# Patient Record
Sex: MALE | Race: WHITE | Employment: FULL TIME | URBAN - METROPOLITAN AREA
[De-identification: names, ages, dates, MRNs, and addresses within clinical notes are randomized per-mention and may not be internally consistent; named-entity substitution may affect disease eponyms.]

---

## 2021-03-10 DIAGNOSIS — Z23 ENCOUNTER FOR IMMUNIZATION: ICD-10-CM

## 2024-09-18 ENCOUNTER — TELEPHONE (OUTPATIENT)
Age: 69
End: 2024-09-18

## 2024-09-18 NOTE — TELEPHONE ENCOUNTER
New Patient Triage  Please Triage:   New Patient-   What is the reason for the patients appointment?  R35.1 (ICD-10-CM) - Nocturia       Imaging/Lab Results:     Insurance:   Do we accept the pt's insurance or is the patient Self-Pay?  Provider & Plan:  medicare   Member ID#:   [id number]         History  Has the pt had any previous urologist? no    Have pt records been requested? No    Has the pt had any outside testing done? no    Does the pt have a personal history of cancer?: no

## 2024-09-19 ENCOUNTER — CONSULT (OUTPATIENT)
Dept: UROLOGY | Facility: CLINIC | Age: 69
End: 2024-09-19
Payer: MEDICARE

## 2024-09-19 VITALS
SYSTOLIC BLOOD PRESSURE: 124 MMHG | OXYGEN SATURATION: 98 % | HEIGHT: 76 IN | HEART RATE: 64 BPM | DIASTOLIC BLOOD PRESSURE: 80 MMHG | BODY MASS INDEX: 30.86 KG/M2 | WEIGHT: 253.4 LBS

## 2024-09-19 DIAGNOSIS — Z12.5 SCREENING FOR PROSTATE CANCER: ICD-10-CM

## 2024-09-19 DIAGNOSIS — R35.1 BENIGN PROSTATIC HYPERPLASIA WITH NOCTURIA: ICD-10-CM

## 2024-09-19 DIAGNOSIS — N40.1 BENIGN PROSTATIC HYPERPLASIA WITH NOCTURIA: ICD-10-CM

## 2024-09-19 DIAGNOSIS — R35.1 NOCTURIA: Primary | ICD-10-CM

## 2024-09-19 LAB
POST-VOID RESIDUAL VOLUME, ML POC: 6 ML
SL AMB  POCT GLUCOSE, UA: ABNORMAL
SL AMB LEUKOCYTE ESTERASE,UA: ABNORMAL
SL AMB POCT BILIRUBIN,UA: ABNORMAL
SL AMB POCT BLOOD,UA: ABNORMAL
SL AMB POCT CLARITY,UA: CLEAR
SL AMB POCT COLOR,UA: YELLOW
SL AMB POCT KETONES,UA: ABNORMAL
SL AMB POCT NITRITE,UA: ABNORMAL
SL AMB POCT PH,UA: 5.6
SL AMB POCT SPECIFIC GRAVITY,UA: 1.03
SL AMB POCT URINE PROTEIN: ABNORMAL
SL AMB POCT UROBILINOGEN: 0.2

## 2024-09-19 PROCEDURE — 99204 OFFICE O/P NEW MOD 45 MIN: CPT | Performed by: PHYSICIAN ASSISTANT

## 2024-09-19 PROCEDURE — 51798 US URINE CAPACITY MEASURE: CPT | Performed by: PHYSICIAN ASSISTANT

## 2024-09-19 PROCEDURE — 81003 URINALYSIS AUTO W/O SCOPE: CPT | Performed by: PHYSICIAN ASSISTANT

## 2024-09-19 RX ORDER — TAMSULOSIN HYDROCHLORIDE 0.4 MG/1
0.4 CAPSULE ORAL
Qty: 30 CAPSULE | Refills: 2 | Status: SHIPPED | OUTPATIENT
Start: 2024-09-19

## 2024-09-19 RX ORDER — IBUPROFEN 600 MG/1
600 TABLET, FILM COATED ORAL EVERY 6 HOURS PRN
COMMUNITY
Start: 2024-05-28 | End: 2024-09-19

## 2024-09-19 NOTE — PROGRESS NOTES
9/19/2024      Chief Complaint   Patient presents with    Nocturia     A year. Last 6 mo uinates 5-6x a night. Limits drinks before bed.          Assessment and Plan    68 y.o. male managed by new patient    1. Nocturia  -     Ambulatory Referral to Urology  -     POCT urine dip auto non-scope  2. Benign prostatic hyperplasia with nocturia  -     tamsulosin (FLOMAX) 0.4 mg; Take 1 capsule (0.4 mg total) by mouth daily with dinner  -     POCT Measure PVR  3. Screening for prostate cancer  -     PSA, Total Screen; Future    PVR 6 mL.  Urinalysis unremarkable.  Continue behavior modifications with evening fluid restriction, and begin tamsulosin 0.4 mg nightly. reviewed risks/benefits/side effect and safety profile.  Return for 8-week symptom reassessment with AUA symptom score  PSA also due this month      History of Present Illness  Magan Perry is a 68 y.o. male here for evaluation of nocturia for the past 6-12 months he is waking 1-2x a night now is 5-6x per night.  No significant daytime bother other than slow stream at times.  No incontinence no urgency.  Therapy tried so far is evening fluid restriction.  He previously had trouble with snoring but he lost weight and this is resolved for several years.  He does not have lower extremity edema.  He goes to bed around 1030, first wake up is 1 AM, then 3 AM, 4 AM, 5 AM and so on.  He also endorses slowing of the stream for several years.  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  No troubles thereafter.    Care everywhere reviewed-treated for diverticulitis in May [facility] ER.  I reviewed the ER note as well as the imaging reports from then- 1 cm left renal cyst no stones no sigmoid perforation or abscess.  Bladder empty, minor prostate calcifications.    Urinalysis today reviewed-trace leukocytes, trace ketones otherwise unremarkable.    Tests reviewed: Formal urinalysis from June also unremarkable.  Annual PSA range  "1.9-2.9-3.1 in recent years reviewed last 3 values.    Today we discussed the normal anatomy of the bladder, prostate, bladder neck, and urethra, as well physiology as it relates to storage and emptying of the bladder utilizing drawn pictures or diagrams. We discussed the incidence of BPH as it relates to obstructive and irritative voiding symptoms as well as diagnosis, and treatment as below. We also discussed risks of delayed diagnosis and treatment including recurrent UTI, stone formation, hematuria, and renal dysfunction that may prompt specific type of treatment. Treatment options discussed include behavior modifications/avoidance of bladder irritants, medications such as Flomax, Proscar or Cialis, and surgery such as Urolift, TURP, prostatectomy. Alternative or concurrent diagnoses of OAB or VIRIDIANA were also reviewed requiring multimodal treatments.    Review of Systems   Constitutional: Negative.    Respiratory: Negative.     Cardiovascular: Negative.    Genitourinary:  Positive for frequency. Negative for decreased urine volume, difficulty urinating, dysuria, flank pain, hematuria, scrotal swelling, testicular pain and urgency.   Musculoskeletal: Negative.                 Vitals  Vitals:    09/19/24 1336   BP: 124/80   BP Location: Left arm   Patient Position: Sitting   Cuff Size: Adult   Pulse: 64   SpO2: 98%   Weight: 115 kg (253 lb 6.4 oz)   Height: 6' 4\" (1.93 m)       Physical Exam  Vitals and nursing note reviewed.   Constitutional:       General: He is not in acute distress.     Appearance: Normal appearance. He is well-developed. He is not diaphoretic.   HENT:      Head: Normocephalic and atraumatic.   Pulmonary:      Effort: Pulmonary effort is normal.      Comments: No cough or audible wheeze  Abdominal:      General: There is no distension.      Tenderness: There is no abdominal tenderness. There is no right CVA tenderness or left CVA tenderness.   Genitourinary:     Comments: Circumcised penis, " normal phallus, orthotopic patent meatus no stenosis.  Testes smooth descended bilaterally into the scrotum nontender with no palpable mass.  Digital rectal exam smooth prostate 50 g, without appreciable nodule, induration or asymmetry  Musculoskeletal:      Right lower leg: No edema.      Left lower leg: No edema.   Skin:     General: Skin is warm and dry.   Neurological:      Mental Status: He is alert and oriented to person, place, and time.      Gait: Gait normal.   Psychiatric:         Speech: Speech normal.         Behavior: Behavior normal.           Past History  Past Medical History:   Diagnosis Date    Diverticulitis 05/2024    Hypertension      Social History     Socioeconomic History    Marital status: /Civil Union     Spouse name: None    Number of children: None    Years of education: None    Highest education level: None   Occupational History    None   Tobacco Use    Smoking status: Former    Smokeless tobacco: Never    Tobacco comments:     quit age of 19   Vaping Use    Vaping status: Never Used   Substance and Sexual Activity    Alcohol use: Yes     Comment: 1-2 a month    Drug use: Not Currently    Sexual activity: None   Other Topics Concern    None   Social History Narrative    1 soda daily     Social Determinants of Health     Financial Resource Strain: Not on file   Food Insecurity: Not on file   Transportation Needs: Not on file   Physical Activity: Not on file   Stress: Not on file   Social Connections: Not on file   Intimate Partner Violence: Not on file   Housing Stability: Not on file     Social History     Tobacco Use   Smoking Status Former   Smokeless Tobacco Never   Tobacco Comments    quit age of 19     Family History   Problem Relation Age of Onset    Thyroid disease Mother     Heart disease Father        The following portions of the patient's history were reviewed and updated as appropriate: allergies, current medications, past medical history, past social history, past  "surgical history and problem list.    Results  Recent Results (from the past 1 hour(s))   POCT urine dip auto non-scope    Collection Time: 09/19/24  1:45 PM   Result Value Ref Range     COLOR,UA Yellow     CLARITY,UA Clear     SPECIFIC GRAVITY,UA 1.030      PH,UA 5.6     LEUKOCYTE ESTERASE,UA Trace     NITRITE,UA Neg     GLUCOSE, UA Neg     KETONES,UA Trace     BILIRUBIN,UA Neg     BLOOD,UA Neg     POCT URINE PROTEIN Neg     SL AMB POCT UROBILINOGEN 0.2    POCT Measure PVR    Collection Time: 09/19/24  2:29 PM   Result Value Ref Range    POST-VOID RESIDUAL VOLUME, ML POC 6 mL   ]  No results found for: \"PSA\"  Lab Results   Component Value Date    CALCIUM 9.4 09/13/2024    K 4.3 09/13/2024    CO2 31 09/13/2024     09/13/2024    BUN 15 09/13/2024    CREATININE 0.78 09/13/2024     No results found for: \"WBC\", \"HGB\", \"HCT\", \"MCV\", \"PLT\"    Portions of the above record have been created with voice recognition software via dictation. Occasional wrong word or \"sound alike\" substitution may have occurred due to the inherent limitations of voice recognition software. Read the chart carefully and recognize, using context, where substitution may have occurred.     "

## 2024-09-20 LAB — PSA SERPL-MCNC: 2.98 NG/ML

## 2024-10-11 DIAGNOSIS — R35.1 BENIGN PROSTATIC HYPERPLASIA WITH NOCTURIA: ICD-10-CM

## 2024-10-11 DIAGNOSIS — N40.1 BENIGN PROSTATIC HYPERPLASIA WITH NOCTURIA: ICD-10-CM

## 2024-10-11 RX ORDER — TAMSULOSIN HYDROCHLORIDE 0.4 MG/1
0.4 CAPSULE ORAL
Qty: 90 CAPSULE | Refills: 1 | Status: SHIPPED | OUTPATIENT
Start: 2024-10-11

## 2024-11-26 ENCOUNTER — OFFICE VISIT (OUTPATIENT)
Dept: UROLOGY | Facility: CLINIC | Age: 69
End: 2024-11-26
Payer: MEDICARE

## 2024-11-26 VITALS
SYSTOLIC BLOOD PRESSURE: 130 MMHG | DIASTOLIC BLOOD PRESSURE: 80 MMHG | BODY MASS INDEX: 31.71 KG/M2 | WEIGHT: 255 LBS | HEIGHT: 75 IN | OXYGEN SATURATION: 95 % | HEART RATE: 64 BPM

## 2024-11-26 DIAGNOSIS — N40.1 BPH ASSOCIATED WITH NOCTURIA: Primary | ICD-10-CM

## 2024-11-26 DIAGNOSIS — R35.1 BPH ASSOCIATED WITH NOCTURIA: Primary | ICD-10-CM

## 2024-11-26 LAB — POST-VOID RESIDUAL VOLUME, ML POC: 30 ML

## 2024-11-26 PROCEDURE — 51798 US URINE CAPACITY MEASURE: CPT | Performed by: PHYSICIAN ASSISTANT

## 2024-11-26 PROCEDURE — 99213 OFFICE O/P EST LOW 20 MIN: CPT | Performed by: PHYSICIAN ASSISTANT

## 2024-11-26 NOTE — PROGRESS NOTES
"Name: Magan Perry      : 1955      MRN: [id number]  Encounter Provider: Dawn Solis PA-C  Encounter Date: 2024   Encounter department: [facility] [place] [facility]  :  Assessment & Plan  BPH associated with nocturia  AUA SS 12, QOL 5; most bothersome symptom nocturia  Brief improvement with tamsulosin, without durable relief  Empties adequately 30ml pvr today  No UTI, retention, hematuria history.  CT with prostatic calcifications and enlargement, but no measurements given [facility]  Discussed considering repeat sleep study- he defers  Discussed considering cystoscopy to eval bladder outlet surgical options  In absence of obstructive BPH could consider OAB rx as alternate    He would like to schedule cysto/trus to evaluate his bladder outlet surgical options for more definitive tx    Orders:    POCT Measure PVR      History of Present Illness   Magan Perry is a 69 y.o. male who presents 8-week follow-up BPH with nocturia weak stream symptomatic just for the past year, began evening fluid restriction behavior modification and tamsulosin fall .  He is here today for symptom reassessment.  He has no daytime bother.  He does note the stream improved slightly with rx, Nocturia improved initially down to 3x a night for a few weeks but has since \"worn off\" and  Nocturia still significant again with 6 wake ups per night. He dreams about urinating and wakes up and walks to the toilet and voids steady stream with volume consistent with his desire/need to void. He had sleep study about 20 yrs ago was told mild sleep apnea no treatment he lost weight and does not snore anymore.    Lab Results   Component Value Date    PSA 2.98 2024       AUA SYMPTOM SCORE      Flowsheet Row Most Recent Value   AUA SYMPTOM SCORE    How often have you had a sensation of not emptying your bladder completely after you finished urinating? 0   How often have you had to " "urinate again less than two hours after you finished urinating? 5   How often have you found you stopped and started again several times when you urinate? 0   How often have you found it difficult to postpone urination? 1   How often have you had a weak urinary stream? 1   How often have you had to push or strain to begin urination? 0   How many times did you most typically get up to urinate from the time you went to bed at night until the time you got up in the morning? 5   Quality of Life: If you were to spend the rest of your life with your urinary condition just the way it is now, how would you feel about that? 3   AUA SYMPTOM SCORE 12          Review of Systems   Constitutional: Negative.    Respiratory: Negative.     Cardiovascular: Negative.    Genitourinary:  Positive for frequency. Negative for decreased urine volume, difficulty urinating, dysuria, flank pain, hematuria, testicular pain and urgency.   Musculoskeletal: Negative.           Objective   /80 (BP Location: Left arm, Patient Position: Sitting, Cuff Size: Large)   Pulse 64   Ht 6' 3\" (1.905 m)   Wt 116 kg (255 lb)   SpO2 95%   BMI 31.87 kg/m²     Physical Exam  Vitals and nursing note reviewed.   Constitutional:       General: He is not in acute distress.     Appearance: He is well-developed. He is not diaphoretic.   HENT:      Head: Normocephalic and atraumatic.   Pulmonary:      Effort: Pulmonary effort is normal.   Musculoskeletal:      Right lower leg: No edema.      Left lower leg: No edema.   Skin:     General: Skin is warm.   Neurological:      Mental Status: He is alert and oriented to person, place, and time.          Results  Lab Results   Component Value Date    PSA 2.98 09/19/2024     Lab Results   Component Value Date    CALCIUM 9.4 09/13/2024    K 4.3 09/13/2024    CO2 31 09/13/2024     09/13/2024    BUN 15 09/13/2024    CREATININE 0.78 09/13/2024     No results found for: \"WBC\", \"HGB\", \"HCT\", \"MCV\", \"PLT\"    Office " Urine Dip  Recent Results (from the past hour)   POCT Measure PVR    Collection Time: 11/26/24  9:59 AM   Result Value Ref Range    POST-VOID RESIDUAL VOLUME, ML POC 30 mL   ]

## 2025-01-15 NOTE — PROGRESS NOTES
Cystoscopy and transrectal ultrasound     Date/Time  1/17/2025 9:00 AM     Performed by  Huang Jaems MD   Authorized by  Huang James MD     Universal Protocol:  procedure performed by consultantConsent: Verbal consent obtained. Written consent obtained.  Risks and benefits: risks, benefits and alternatives were discussed  Consent given by: patient  Patient understanding: patient states understanding of the procedure being performed  Patient consent: the patient's understanding of the procedure matches consent given  Procedure consent: procedure consent matches procedure scheduled  Relevant documents: relevant documents present and verified  Test results: test results available and properly labeled  Site marked: the operative site was marked  Patient identity confirmed: verbally with patient      Procedure Details:  Procedure type: cystoscopy    Patient tolerance: Patient tolerated the procedure well with no immediate complications    Additional Procedure Details: Office Cystoscopy Procedure Note    Indication:     medically refractory lower urinary tract symptoms     Informed consent   The risks, benefits, complications, treatment options, and expected outcomes were discussed with the patient. The patient concurred with the proposed plan and provided informed consent.    Anesthesia  Lidocaine jelly 2%    Antibiotic prophylaxis   None    Procedure  The patient was placed in the supineposition, was prepped and draped in the usual manner using sterile technique, and 2% lidocaine jelly instilled into the urethra.  A 17 F flexible cystoscope was then inserted into the urethra and the urethra and bladder carefully examined.  Transrectal ultrasound was then introduced and prostate measurements were taken. The following findings were noted:    Findings:  Urethra:  Normal  Prostate:  Moderate lateral lobe hypertrophy, no median lobe, no lesions  Bladder:  Mild to moderate trabeculations. A few shallow bladder  cellules  Ureteral orifices:  orthotopic  Other findings:  None, retroflexed view confirms    Prostate volume: 47cc.    Specimens: None                 Complications:    None; patient tolerated the procedure well           Disposition: To home            Condition: Stable    Plan:    We had an extensive discussion regarding the various treatment options for benign prostatic hyperplasia (BPH).      We discussed the role of watchful waiting and lifestyle modifications (decrease caffeine, PM fluids, alcohol) to help with the symptoms of BPH. The risk of monitoring symptoms overtime include bladder decompensation and kidney injury, which may require long term indwelling Bustamante catheter, which I explained would mean a tube in the bladder to keep his urinary tract unobstructed.  I stressed the importance of this during our discussion, but also explained that his symptoms may progress slowly.    We discussed medical management with alpha blockers and/or 5-alpha reductase inhibitors including side effects of these medications.  Patients may experience a mild to moderate improvement (results vary) in lower urinary tract symptoms based on IPSS scores, with combination pharmacotherapy being the most effective. Cialis, a medication typically used for erectile dysfunction can be used by itself or in combination with these other therapies and has shown efficacy as well.    Minimally invasive treatment (MIST) for bladder outlet obstruction include Urolift and Rezum.  Both treatments were explained in detail.  I explained that our practice does or will offer these treatments in the near future.  Any-procedural expectations of both procedures were described.  The biggest advantage of MIST for bladder outlet obstruction is the preservation of antegrade ejaculation. A recent study showed that the risk of retrograde ejaculation after Rezum can be as high as 25%, but still lower when compared to other bladder outlet procedures.   Patients may experience a mild to moderate improvement in lower urinary tract symptoms, similar to combination pharmacotherapy with MIST.  In my opinion, the disadvantages of MIST are lack of long term data (> 5 year) as well as treatment failure requiring additional bladder outlet procedures over time.  However, in the properly selected patient Urolift or Rezum can be successful.  Furthermore, treatment failure would not preclude the patient from a salvage bladder outlet procedure.        Prostate between 30-80mL.  For men with prostates between 30-80 mL, bladder outlet procedures considered to be definitive include TURP/TUIP. I explained that TURP is the resection of prostate tissue and considered the historical Gold standard.  Patients can expect a significant improvement in lower urinary tract symptoms after the procedure.  A Bustamante catheter is usually kept in place after surgery for 1-2 days following the procedure.  The risk of retrograde ejaculation can be as high as 45 % from the procedure.   I explained that bleeding complications from bladder outlet procedure may require take-backs to the operating room as well as the need for blood transfusions.          He is interested in TURP and will discuss further with his family. He will call the office with his decision. We can either schedule for surgery or follow up depending.     If he elects against surgery now, we can discuss trial of additional medications. OAB medication on top of alpha blockade may be beneficial for him.

## 2025-01-17 ENCOUNTER — PROCEDURE VISIT (OUTPATIENT)
Dept: UROLOGY | Facility: CLINIC | Age: 70
End: 2025-01-17
Payer: MEDICARE

## 2025-01-17 VITALS
OXYGEN SATURATION: 97 % | HEART RATE: 83 BPM | DIASTOLIC BLOOD PRESSURE: 82 MMHG | SYSTOLIC BLOOD PRESSURE: 130 MMHG | BODY MASS INDEX: 32.2 KG/M2 | WEIGHT: 259 LBS | HEIGHT: 75 IN

## 2025-01-17 DIAGNOSIS — R35.1 BPH ASSOCIATED WITH NOCTURIA: Primary | ICD-10-CM

## 2025-01-17 DIAGNOSIS — N40.1 BENIGN PROSTATIC HYPERPLASIA WITH NOCTURIA: ICD-10-CM

## 2025-01-17 DIAGNOSIS — R35.1 BENIGN PROSTATIC HYPERPLASIA WITH NOCTURIA: ICD-10-CM

## 2025-01-17 DIAGNOSIS — N40.1 BPH ASSOCIATED WITH NOCTURIA: Primary | ICD-10-CM

## 2025-01-17 LAB
SL AMB  POCT GLUCOSE, UA: ABNORMAL
SL AMB LEUKOCYTE ESTERASE,UA: ABNORMAL
SL AMB POCT BILIRUBIN,UA: ABNORMAL
SL AMB POCT BLOOD,UA: ABNORMAL
SL AMB POCT CLARITY,UA: CLEAR
SL AMB POCT COLOR,UA: ABNORMAL
SL AMB POCT KETONES,UA: ABNORMAL
SL AMB POCT NITRITE,UA: ABNORMAL
SL AMB POCT PH,UA: 6.5
SL AMB POCT SPECIFIC GRAVITY,UA: 1.01
SL AMB POCT URINE PROTEIN: ABNORMAL
SL AMB POCT UROBILINOGEN: 0.2

## 2025-01-17 PROCEDURE — 76872 US TRANSRECTAL: CPT

## 2025-01-17 PROCEDURE — 52000 CYSTOURETHROSCOPY: CPT

## 2025-01-17 PROCEDURE — 81002 URINALYSIS NONAUTO W/O SCOPE: CPT

## 2025-01-17 NOTE — LETTER
January 17, 2025     Dawn Solis PA-C  [address] [facility]  [address] [place] [address]    Patient: Magan Perry   YOB: 1955   Date of Visit: 1/17/2025           Thank you for referring Magan Perry to me for evaluation. Below are my notes for this consultation.    If you have questions, please do not hesitate to call me. I look forward to following your patient along with you.         Sincerely,        Huang James MD        CC: No Recipients    Huang James MD  1/17/2025 12:35 PM  Sign when Signing Visit     Cystoscopy and transrectal ultrasound     Date/Time  1/17/2025 9:00 AM     Performed by  Huang James MD   Authorized by  Huang James MD     Universal Protocol:  procedure performed by consultantConsent: Verbal consent obtained. Written consent obtained.  Risks and benefits: risks, benefits and alternatives were discussed  Consent given by: patient  Patient understanding: patient states understanding of the procedure being performed  Patient consent: the patient's understanding of the procedure matches consent given  Procedure consent: procedure consent matches procedure scheduled  Relevant documents: relevant documents present and verified  Test results: test results available and properly labeled  Site marked: the operative site was marked  Patient identity confirmed: verbally with patient      Procedure Details:  Procedure type: cystoscopy    Patient tolerance: Patient tolerated the procedure well with no immediate complications    Additional Procedure Details: Office Cystoscopy Procedure Note    Indication:     medically refractory lower urinary tract symptoms     Informed consent   The risks, benefits, complications, treatment options, and expected outcomes were discussed with the patient. The patient concurred with the proposed plan and provided informed consent.    Anesthesia  Lidocaine jelly 2%    Antibiotic prophylaxis   None    Procedure  The patient was placed in the  supineposition, was prepped and draped in the usual manner using sterile technique, and 2% lidocaine jelly instilled into the urethra.  A 17 F flexible cystoscope was then inserted into the urethra and the urethra and bladder carefully examined.  Transrectal ultrasound was then introduced and prostate measurements were taken. The following findings were noted:    Findings:  Urethra:  Normal  Prostate:  Moderate lateral lobe hypertrophy, no median lobe, no lesions  Bladder:  Mild to moderate trabeculations. A few shallow bladder cellules  Ureteral orifices:  orthotopic  Other findings:  None, retroflexed view confirms    Prostate volume: 47cc.    Specimens: None                 Complications:    None; patient tolerated the procedure well           Disposition: To home            Condition: Stable    Plan:    We had an extensive discussion regarding the various treatment options for benign prostatic hyperplasia (BPH).      We discussed the role of watchful waiting and lifestyle modifications (decrease caffeine, PM fluids, alcohol) to help with the symptoms of BPH. The risk of monitoring symptoms overtime include bladder decompensation and kidney injury, which may require long term indwelling Bustamante catheter, which I explained would mean a tube in the bladder to keep his urinary tract unobstructed.  I stressed the importance of this during our discussion, but also explained that his symptoms may progress slowly.    We discussed medical management with alpha blockers and/or 5-alpha reductase inhibitors including side effects of these medications.  Patients may experience a mild to moderate improvement (results vary) in lower urinary tract symptoms based on IPSS scores, with combination pharmacotherapy being the most effective. Cialis, a medication typically used for erectile dysfunction can be used by itself or in combination with these other therapies and has shown efficacy as well.    Minimally invasive treatment  (MIST) for bladder outlet obstruction include Urolift and Rezum.  Both treatments were explained in detail.  I explained that our practice does or will offer these treatments in the near future.  Any-procedural expectations of both procedures were described.  The biggest advantage of MIST for bladder outlet obstruction is the preservation of antegrade ejaculation. A recent study showed that the risk of retrograde ejaculation after Rezum can be as high as 25%, but still lower when compared to other bladder outlet procedures.  Patients may experience a mild to moderate improvement in lower urinary tract symptoms, similar to combination pharmacotherapy with MIST.  In my opinion, the disadvantages of MIST are lack of long term data (> 5 year) as well as treatment failure requiring additional bladder outlet procedures over time.  However, in the properly selected patient Urolift or Rezum can be successful.  Furthermore, treatment failure would not preclude the patient from a salvage bladder outlet procedure.        Prostate between 30-80mL.  For men with prostates between 30-80 mL, bladder outlet procedures considered to be definitive include TURP/TUIP. I explained that TURP is the resection of prostate tissue and considered the historical Gold standard.  Patients can expect a significant improvement in lower urinary tract symptoms after the procedure.  A Bustamante catheter is usually kept in place after surgery for 1-2 days following the procedure.  The risk of retrograde ejaculation can be as high as 45 % from the procedure.   I explained that bleeding complications from bladder outlet procedure may require take-backs to the operating room as well as the need for blood transfusions.          He is interested in TURP and will discuss further with his family. He will call the office with his decision. We can either schedule for surgery or follow up depending.     If he elects against surgery now, we can discuss trial of  additional medications. OAB medication on top of alpha blockade may be beneficial for him.